# Patient Record
Sex: MALE | Race: WHITE | NOT HISPANIC OR LATINO
[De-identification: names, ages, dates, MRNs, and addresses within clinical notes are randomized per-mention and may not be internally consistent; named-entity substitution may affect disease eponyms.]

---

## 2022-03-22 PROBLEM — Z00.00 ENCOUNTER FOR PREVENTIVE HEALTH EXAMINATION: Status: ACTIVE | Noted: 2022-03-22

## 2022-03-23 ENCOUNTER — APPOINTMENT (OUTPATIENT)
Dept: ORTHOPEDIC SURGERY | Facility: CLINIC | Age: 22
End: 2022-03-23
Payer: COMMERCIAL

## 2022-03-23 DIAGNOSIS — M21.821 OTHER SPECIFIED ACQUIRED DEFORMITIES OF RIGHT UPPER ARM: ICD-10-CM

## 2022-03-23 DIAGNOSIS — S43.431A SUPERIOR GLENOID LABRUM LESION OF RIGHT SHOULDER, INITIAL ENCOUNTER: ICD-10-CM

## 2022-03-23 PROCEDURE — 99204 OFFICE O/P NEW MOD 45 MIN: CPT

## 2022-03-23 NOTE — HISTORY OF PRESENT ILLNESS
[de-identified] : RIGHT SHOULDER PAIN \par FEBRUARY 27, 2022- PT DISLOCATED SHOULDER PLAYING BASKET BALL \par FIRST DISLOCATION OCT 2019 - FLAG FOOTBALL - JANUARY 2020 ARTHROSCOPIC SURGERY TO REPAIR TORN LABRUM \par INTERMITTENT \par PAIN LEVEL: 1/10\par DULL\par BETTER WITH REST \par LIMITED ROM \par WORSE WITH OVER USE, OVER HEAD LIFTING,REACHING BEHIND\par PATIENT HAS X-RAYS AVAILABLE FROM [facility] -2/27/22 \par PT HAS MRI AVAILABLE

## 2022-03-23 NOTE — DISCUSSION/SUMMARY
[de-identified] : RECURRENT RIGHT SHOULDER INSTABILITY AFTER LABRUM REPAIR 2020\par \par MRI REVEALS RECURRENT LABRUM TEAR AND HILL SACHS LESION\par \par CONSIDER REVISION LABRUM REPAIR + LATARJET VS REMPLISSAGE

## 2022-03-23 NOTE — PHYSICAL EXAM
[de-identified] : PHYSICAL EXAM  RIGHT  SHOULDER\par \par MODERATE  SCAPULAR PROTRACTION\par AROM 130 / 130 / 80 / 15 \par TENDER: GH JOINT ANTERIOR \par \par SPECIAL TESTING :\par ZARAGOZA - POSITIVE \par MINERVA - POSITIVE \par SPEED TEST - POSITIVE\par \par MIRANDA - NEGATIVE \par APPREHENSION AND SUPPRESSION - NEGATIVE \par \par RC STRENGTH TESTING \par SS:  5/5\par SUB 5/5\par IS     5/5\par BICEPS  5/5\par \par SENSATION  - GROSSLY INTACT\par \par \par

## 2022-10-03 ENCOUNTER — NON-APPOINTMENT (OUTPATIENT)
Age: 22
End: 2022-10-03